# Patient Record
Sex: FEMALE | Race: WHITE
[De-identification: names, ages, dates, MRNs, and addresses within clinical notes are randomized per-mention and may not be internally consistent; named-entity substitution may affect disease eponyms.]

---

## 2022-12-01 ENCOUNTER — HOSPITAL ENCOUNTER (EMERGENCY)
Dept: HOSPITAL 97 - ER | Age: 32
Discharge: ANOTHER HOSPITAL | End: 2022-12-01
Payer: COMMERCIAL

## 2022-12-01 VITALS — TEMPERATURE: 97.9 F

## 2022-12-01 VITALS — DIASTOLIC BLOOD PRESSURE: 56 MMHG | SYSTOLIC BLOOD PRESSURE: 96 MMHG | OXYGEN SATURATION: 97 %

## 2022-12-01 DIAGNOSIS — K80.63: Primary | ICD-10-CM

## 2022-12-01 DIAGNOSIS — Z20.822: ICD-10-CM

## 2022-12-01 DIAGNOSIS — Z88.1: ICD-10-CM

## 2022-12-01 DIAGNOSIS — Z88.8: ICD-10-CM

## 2022-12-01 LAB
ALBUMIN SERPL BCP-MCNC: 3.6 G/DL (ref 3.4–5)
ALP SERPL-CCNC: 90 U/L (ref 45–117)
ALT SERPL W P-5'-P-CCNC: 654 U/L (ref 12–78)
AST SERPL W P-5'-P-CCNC: 862 U/L (ref 15–37)
BUN BLD-MCNC: 11 MG/DL (ref 7–18)
GLUCOSE SERPLBLD-MCNC: 106 MG/DL (ref 74–106)
HCT VFR BLD CALC: 39.4 % (ref 36–45)
LIPASE SERPL-CCNC: 163 U/L (ref 73–393)
LYMPHOCYTES # SPEC AUTO: 1.4 K/UL (ref 0.7–4.9)
MCV RBC: 94.1 FL (ref 80–100)
PMV BLD: 8.6 FL (ref 7.6–11.3)
POTASSIUM SERPL-SCNC: 4.1 MMOL/L (ref 3.5–5.1)
RBC # BLD: 4.18 M/UL (ref 3.86–4.86)

## 2022-12-01 PROCEDURE — 99285 EMERGENCY DEPT VISIT HI MDM: CPT

## 2022-12-01 PROCEDURE — 85025 COMPLETE CBC W/AUTO DIFF WBC: CPT

## 2022-12-01 PROCEDURE — 96374 THER/PROPH/DIAG INJ IV PUSH: CPT

## 2022-12-01 PROCEDURE — 80053 COMPREHEN METABOLIC PANEL: CPT

## 2022-12-01 PROCEDURE — 81003 URINALYSIS AUTO W/O SCOPE: CPT

## 2022-12-01 PROCEDURE — 76705 ECHO EXAM OF ABDOMEN: CPT

## 2022-12-01 PROCEDURE — 36415 COLL VENOUS BLD VENIPUNCTURE: CPT

## 2022-12-01 PROCEDURE — 87811 SARS-COV-2 COVID19 W/OPTIC: CPT

## 2022-12-01 PROCEDURE — 83690 ASSAY OF LIPASE: CPT

## 2022-12-01 NOTE — EDPHYS
Physician Documentation                                                                           

 [facility]                                                                 

Name: Adri Chaves                                                                               

Age: 32 yrs                                                                                       

Sex: Female                                                                                       

: 1990                                                                                   

MRN: [id number]                                                                                   

Arrival Date: 2022                                                                          

Time: 13:59                                                                                       

Account#: [id number]                                                                            

Bed 18                                                                                            

Private MD:                                                                                       

MASTER Physician Ryan Reeves                                                                      

HPI:                                                                                              

                                                                                             

16:35 This 32 yrs old Female presents to ER via Ambulatory with complaints of gallbladder.    cp  

16:35 The patient presents with abdominal pain in the epigastric area.                        cp  

16:35 Onset: The symptoms/episode began/occurred this morning. Associated signs and symptoms: cp  

      Pertinent positives: anorexia, nausea, mid back pain times 4 days, Pertinent negatives:     

      dysuria, fever, headache, vomiting. Severity of pain: in the emergency department the       

      pain has improved moderately. Patient reports she was seen earlier today at [facility]       

      [facility] and diagnosed with gallstones. Patient declined transfer for surgical         

      evaluation and was going to f/u with general surgery.                                       

                                                                                                  

Historical:                                                                                       

- Allergies:                                                                                      

14:22 azithromycin;                                                                           ld1 

17:41 OXYTOCIN;                                                                               iw  

- PMHx:                                                                                           

14:22 Gallstone;                                                                              ld1 

- PSHx:                                                                                           

14:22  section;                                                                       ld1 

                                                                                                  

- Immunization history:: Adult Immunizations up to date, Client reports receiving the             

  2nd dose of the Covid vaccine.                                                                  

- Social history:: Smoking status: Patient denies any tobacco usage or history of.                

  Patient/guardian denies using alcohol.                                                          

                                                                                                  

                                                                                                  

ROS:                                                                                              

16:40 Abdomen/GI: Positive for abdominal pain, nausea, anorexia, Negative for vomiting,       cp  

      diarrhea, constipation.                                                                     

16:40 Back: Positive for pain at rest, of the mid back area.                                  cp  

                                                                                                  

Exam:                                                                                             

16:45 Constitutional: The patient appears in no acute distress, alert, awake,                 cp  

      non-diaphoretic, non-toxic, well developed, well nourished.                                 

16:45 Head/Face:  Normocephalic, atraumatic.                                                  cp  

16:45 Eyes: Periorbital structures: appear normal, Conjunctiva: normal, no exudate, no            

      injection, Sclera: no appreciated abnormality, Lids and lashes: appear normal,              

      bilaterally.                                                                                

16:45 ENT: External ear(s): are unremarkable, Nose: is normal, Mouth: Lips: moist, Oral           

      mucosa: moist, Posterior pharynx: Airway: no evidence of obstruction, patent.               

16:45 Chest/axilla: Inspection: normal.                                                           

16:45 Cardiovascular: Rate: normal, Rhythm: regular.                                              

16:45 Respiratory: the patient does not display signs of respiratory distress,  Respirations:     

      normal, no use of accessory muscles, no retractions, labored breathing, is not present,     

      Breath sounds: are clear throughout, no decreased breath sounds, no stridor, no             

      wheezing.                                                                                   

16:45 Abdomen/GI: Inspection: abdomen appears normal, Bowel sounds: active, all quadrants,        

      Palpation: soft, in all quadrants, moderate abdominal tenderness, in the epigastric         

      area and right upper quadrant, rebound tenderness, is not appreciated, involuntary          

      guarding, is not appreciated.                                                               

16:45 Back: pain, that is moderate, of the  mid back area, ROM is normal.                         

16:45 Skin: no rash present.                                                                      

16:45 Neuro: Orientation: to person, place \T\ time. Mentation: is normal, Motor: moves all       

      fours, strength is normal, Sensation: is normal.                                            

                                                                                                  

Vital Signs:                                                                                      

14:20  / 83; Pulse 84; Resp 18; Temp 97.9(O); Pulse Ox 99% on R/A; Weight 81.19 kg;     ld1 

      Height 5 ft. 4 in. (162.56 cm); Pain 7/10;                                                  

19:25  / 67; Pulse 86; Resp 18; Pulse Ox 100% on R/A;                                   ld1 

20:30  / 63; Pulse 77; Resp 18; Pulse Ox 100% on R/A;                                   ld1 

21:33 BP 96 / 56; Pulse 79; Resp 18; Pulse Ox 97% on R/A;                                     ld1 

14:20 Body Mass Index 30.72 (81.19 kg, 162.56 cm)                                             ld1 

                                                                                                  

MDM:                                                                                              

14:24 Patient medically screened.                                                               

                                                                                                  

                                                                                             

16:27 Order name: CBC with Diff; Complete Time: 17:35                                         cp  

                                                                                             

17:35 Interpretation: Normal except: SAUNDRA% 76.0.                                                 

                                                                                             

16:27 Order name: CMP; Complete Time: 18:22                                                   cp  

                                                                                             

18:22 Interpretation: Reviewed.                                                                 

                                                                                             

16:27 Order name: Lipase; Complete Time: 18:22                                                cp  

                                                                                             

16:28 Order name: SARS RAPID; Complete Time: 18:22                                            cp  

                                                                                             

16:27 Order name: US Abdomen Limited: gallbladder; Complete Time: 19:03                       cp  

                                                                                             

19:06 Interpretation: Report reviewed.                                                          

                                                                                             

19:09 Order name: Urine Dipstick-Ancillary; Complete Time: 19:16                              EDMS

                                                                                             

16:27 Order name: IV Saline Lock; Complete Time: 17:17                                          

                                                                                             

16:27 Order name: Labs collected and sent; Complete Time: 17:17                                 

                                                                                             

16:27 Order name: Urine Dipstick-Ancillary (obtain specimen); Complete Time: 19:07              

                                                                                             

16:27 Order name: Urine Pregnancy Test (obtain specimen); Complete Time: 19:07                  

                                                                                             

17:38 Order name: NPO; Complete Time: 19:00                                                   cp  

                                                                                                  

Administered Medications:                                                                         

17:29 Drug: NS 0.9% 1000 ml Route: IV; Rate: 1 bolus; Site: left forearm;                     iw  

17:29 Not Given (Patient Refused): Pepcid (famotidine) 20 mg IVP once; dilute with 10 mL 0.9% iw  

      NaCl; give over 2 minutes                                                                   

17:29 Not Given (Patient Refused): Zofran (Ondansetron) 4 mg IVP once; over 2 minutes         iw  

19:25 Drug: Zosyn (piperacillin-tazobactam) 3.375 grams Route: IVPB; Infused Over: 60 mins;   ld1 

      Site: left antecubital;                                                                     

19:25 Not Given (Patient Refused): morphine 2 mg IVP once over 4 mins                         ld1 

19:25 Not Given (Patient Refused): morphine 2 mg IVP once over 4 mins                         ld1 

                                                                                                  

                                                                                                  

Disposition Summary:                                                                              

22 18:25                                                                                    

Transfer Ordered                                                                                  

      Transfer Location: [facility]                                cp  

      Reason: Higher level of care                                                            cp  

      Condition: Stable                                                                       cp  

      Problem: new                                                                            cp  

      Symptoms: have improved                                                                 cp  

      Accepting Physician: DR VANIA Sandoval(22 21:34)                                    ld1 

      Diagnosis                                                                                   

        - Calculus of gallbladder and bile duct with acute cholecystitis with obstruction     cp  

      Forms:                                                                                      

        - Medication Reconciliation Form                                                      cp  

        - SBAR form                                                                           cp  

Addendum:                                                                                         

2022                                                                                        

     08:02 Co-signature as Attending Physician, Ryan Reeves MD I agree with the assessment and  c
ha

           plan of care.                                                                          

                                                                                                  

Signatures:                                                                                       

Dispatcher [employer]                           EDMS                                                 

Ryan Reeves MD MD cha Williams, Irene RN                     RN   iw                                                   

Ryan Turner PA PA   cp                                                   

Dibbern, Jessy, RN                     RN   ld1                                                  

                                                                                                  

Corrections: (The following items were deleted from the chart)                                    

                                                                                             

14:22 14:22 Allergies: No Known Allergies; ld1                                                ld1 

17:35 17:35 Normal except. cp                                                                 cp  

19:19 18:25 doctor cp                                                                         cp  

21:34 19:19 DR VANIA Sandoval cp                                                                ld1 

                                                                                                  

**************************************************************************************************

## 2022-12-01 NOTE — RAD REPORT
EXAM DESCRIPTION:  US - Abdomen Exam Limited - 12/1/2022 6:16 pm

 

CLINICAL HISTORY:  ABD PAIN

 

COMPARISON:  No comparisons

 

FINDINGS:  The gallbladder demonstrates extensive cholelithiasis. No pericholecystic fluid or gallbla
dder wall thickening. The common bile duct is normal measuring 5-6 mm..

 

The liver demonstrates no findings of intrahepatic biliary dilatation.

 

IMPRESSION:  Extensive cholelithiasis.

## 2022-12-01 NOTE — ER
Nurse's Notes                                                                                     

 [facility]                                                                 

Name: Adri Chaves                                                                               

Age: 32 yrs                                                                                       

Sex: Female                                                                                       

: 1990                                                                                   

MRN: [id number]                                                                                   

Arrival Date: 2022                                                                          

Time: 13:59                                                                                       

Account#: [id number]                                                                            

Bed 18                                                                                            

Private MD:                                                                                       

Diagnosis: Calculus of gallbladder and bile duct with acute cholecystitis with obstruction        

                                                                                                  

Presentation:                                                                                     

                                                                                             

14:20 Chief complaint: Patient states: Went to [facility] today - gallstones. Pt reports     ld1 

      going home with prescriptions but "wants to be admitted.". Coronavirus screen: At this      

      time, the client does not indicate any symptoms associated with coronavirus-19. Ebola       

      Screen: No symptoms or risks identified at this time. Initial Sepsis Screen: Does the       

      patient meet any 2 criteria? No. Patient's initial sepsis screen is negative. Does the      

      patient have a suspected source of infection? No. Patient's initial sepsis screen is        

      negative. Risk Assessment: Do you want to hurt yourself or someone else? Patient            

      reports no desire to harm self or others. Onset of symptoms was 2022.          

14:20 Method Of Arrival: Ambulatory                                                           ld1 

14:20 Acuity: ELLEN 3                                                                           ld1 

                                                                                                  

Triage Assessment:                                                                                

14:22 General: Appears in no apparent distress. uncomfortable, Behavior is calm, cooperative, ld1 

      appropriate for age. Pain: Complains of pain in abdomen Pain does not radiate. Pain         

      currently is 7 out of 10 on a pain scale. Quality of pain is described as sharp,            

      shooting, throbbing. EENT: No signs and/or symptoms were reported regarding the EENT        

      system. Neuro: Level of Consciousness is awake, alert, obeys commands, Oriented to          

      person, place, time, situation, Appropriate for age. Cardiovascular: Capillary refill <     

      3 seconds Patient's skin is warm and dry. Respiratory: Airway is patent Respiratory         

      effort is even, unlabored. GI: Abdomen is round non-distended. : No signs and/or          

      symptoms were reported regarding the genitourinary system. Derm: No signs and/or            

      symptoms reported regarding the dermatologic system. Musculoskeletal: No signs and/or       

      symptoms reported regarding the musculoskeletal system.                                     

                                                                                                  

Historical:                                                                                       

- Allergies:                                                                                      

14:22 azithromycin;                                                                           ld1 

17:41 OXYTOCIN;                                                                               iw  

- PMHx:                                                                                           

14:22 Gallstone;                                                                              ld1 

- PSHx:                                                                                           

14:22  section;                                                                       ld1 

                                                                                                  

- Immunization history:: Adult Immunizations up to date, Client reports receiving the             

  2nd dose of the Covid vaccine.                                                                  

- Social history:: Smoking status: Patient denies any tobacco usage or history of.                

  Patient/guardian denies using alcohol.                                                          

                                                                                                  

                                                                                                  

Screenin:26 Abuse screen: Denies threats or abuse. Denies injuries from another. Nutritional        ld1 

      screening: No deficits noted. Tuberculosis screening: No symptoms or risk factors           

      identified. Fall Risk None identified.                                                      

                                                                                                  

Assessment:                                                                                       

19:26 General: Appears in no apparent distress. comfortable, Behavior is calm, cooperative,   ld1 

      appropriate for age. Pain: Complains of pain in abdomen Pain does not radiate. Pain         

      currently is 6 out of 10 on a pain scale. Quality of pain is described as throbbing.        

      Neuro: Level of Consciousness is awake, alert, obeys commands, Oriented to person,          

      place, time, situation. Cardiovascular: Capillary refill < 3 seconds Patient's skin is      

      warm and dry. Respiratory: Airway is patent Respiratory effort is even, unlabored. GI:      

      Abdomen is round non-distended, Reports lower abdominal pain, upper abdominal pain. :     

      No signs and/or symptoms were reported regarding the genitourinary system. EENT: No         

      signs and/or symptoms were reported regarding the EENT system. Derm: No signs and/or        

      symptoms reported regarding the dermatologic system. Musculoskeletal: No signs and/or       

      symptoms reported regarding the musculoskeletal system.                                     

21:33 Reassessment: Patient appears in no apparent distress at this time. Patient and/or      ld1 

      family updated on plan of care and expected duration. Pain level reassessed. Patient is     

      alert, oriented x 3, equal unlabored respirations, skin warm/dry/pink.                      

                                                                                                  

Vital Signs:                                                                                      

14:20  / 83; Pulse 84; Resp 18; Temp 97.9(O); Pulse Ox 99% on R/A; Weight 81.19 kg;     ld1 

      Height 5 ft. 4 in. (162.56 cm); Pain 7/10;                                                  

19:25  / 67; Pulse 86; Resp 18; Pulse Ox 100% on R/A;                                   ld1 

20:30  / 63; Pulse 77; Resp 18; Pulse Ox 100% on R/A;                                   ld1 

21:33 BP 96 / 56; Pulse 79; Resp 18; Pulse Ox 97% on R/A;                                     ld1 

14:20 Body Mass Index 30.72 (81.19 kg, 162.56 cm)                                             ld1 

                                                                                                  

ED Course:                                                                                        

13:59 Patient arrived in ED.                                                                  as  

14:02 Ryan Turner PA is PHCP.                                                                cp  

14:02 Ryan Reeves MD is Attending Physician.                                             cp  

14:22 Triage completed.                                                                       ld1 

14:22 Arm band placed on right wrist.                                                         ld1 

17:14 Emma Ceja RN is Primary Nurse.                                                   iw  

17:14 Inserted saline lock: 22 gauge in left forearm, using aseptic technique.                iw  

18:18 US Abdomen Limited: gallbladder In Process Unspecified.                                 EDMS

19:26 Patient has correct armband on for positive identification. Placed in gown. Bed in low  ld1 

      position. Call light in reach. Side rails up X2. Cardiac monitor on. Pulse ox on. NIBP      

      on. Door closed. Noise minimized. Warm blanket given.                                       

19:26 No provider procedures requiring assistance completed.                                  ld1 

21:33 Patient transferred, IV remains in place.                                               ld1 

                                                                                                  

Administered Medications:                                                                         

17:29 Drug: NS 0.9% 1000 ml Route: IV; Rate: 1 bolus; Site: left forearm;                     iw  

17:29 Not Given (Patient Refused): Pepcid (famotidine) 20 mg IVP once; dilute with 10 mL 0.9% iw  

      NaCl; give over 2 minutes                                                                   

17:29 Not Given (Patient Refused): Zofran (Ondansetron) 4 mg IVP once; over 2 minutes         iw  

19:25 Drug: Zosyn (piperacillin-tazobactam) 3.375 grams Route: IVPB; Infused Over: 60 mins;   ld1 

      Site: left antecubital;                                                                     

19:25 Not Given (Patient Refused): morphine 2 mg IVP once over 4 mins                         ld1 

19:25 Not Given (Patient Refused): morphine 2 mg IVP once over 4 mins                         ld1 

                                                                                                  

                                                                                                  

Medication:                                                                                       

19:26 VIS not applicable for this client.                                                     ld1 

                                                                                                  

Outcome:                                                                                          

18:25 ER care complete, transfer ordered by MD.                                               cp  

21:33 Transferred by ground EMS to [facility].                             ld1 

21:33 Condition: stable                                                                           

21:33 Instructed on the need for transfer.                                                        

21:34 Patient left the ED.                                                                    ld1 

                                                                                                  

Signatures:                                                                                       

Dispatcher [employer]                           EDMS                                                 

Consuelo Le                             as                                                   

Emma Ceja RN                     RN   iw                                                   

Page, ELÍAS Davis cp, Lauren, RN                     RN   ld1                                                  

                                                                                                  

Corrections: (The following items were deleted from the chart)                                    

14: 14:22 Allergies: No Known Allergies; ld1                                                ld1 

17:29 17:29 Pepcid (famotidine) 20 mg IVP in left forearm iw                                  iw  

                                                                                                  

**************************************************************************************************

## 2022-12-21 ENCOUNTER — HOSPITAL ENCOUNTER (EMERGENCY)
Dept: HOSPITAL 97 - ER | Age: 32
Discharge: HOME | End: 2022-12-21
Payer: COMMERCIAL

## 2022-12-21 VITALS — OXYGEN SATURATION: 99 % | SYSTOLIC BLOOD PRESSURE: 101 MMHG | DIASTOLIC BLOOD PRESSURE: 65 MMHG

## 2022-12-21 VITALS — TEMPERATURE: 98 F

## 2022-12-21 DIAGNOSIS — Z90.49: ICD-10-CM

## 2022-12-21 DIAGNOSIS — Z88.5: ICD-10-CM

## 2022-12-21 DIAGNOSIS — Z88.1: ICD-10-CM

## 2022-12-21 DIAGNOSIS — R11.2: ICD-10-CM

## 2022-12-21 DIAGNOSIS — R51.9: Primary | ICD-10-CM

## 2022-12-21 DIAGNOSIS — Z20.822: ICD-10-CM

## 2022-12-21 LAB
ALBUMIN SERPL BCP-MCNC: 3.8 G/DL (ref 3.4–5)
ALP SERPL-CCNC: 88 U/L (ref 45–117)
ALT SERPL W P-5'-P-CCNC: 25 U/L (ref 13–56)
AST SERPL W P-5'-P-CCNC: 12 U/L (ref 15–37)
BUN BLD-MCNC: 14 MG/DL (ref 7–18)
GLUCOSE SERPLBLD-MCNC: 106 MG/DL (ref 74–106)
HCT VFR BLD CALC: 40.1 % (ref 36–45)
LIPASE SERPL-CCNC: 141 U/L (ref 73–393)
LYMPHOCYTES # SPEC AUTO: 1.3 K/UL (ref 0.7–4.9)
MCV RBC: 94.7 FL (ref 80–100)
PMV BLD: 7.7 FL (ref 7.6–11.3)
POTASSIUM SERPL-SCNC: 4.3 MMOL/L (ref 3.5–5.1)
RBC # BLD: 4.24 M/UL (ref 3.86–4.86)
SARS-COV-2 RNA RESP QL NAA+PROBE: NEGATIVE
SP GR UR: 1.02 (ref 1–1.03)

## 2022-12-21 PROCEDURE — 81003 URINALYSIS AUTO W/O SCOPE: CPT

## 2022-12-21 PROCEDURE — 80053 COMPREHEN METABOLIC PANEL: CPT

## 2022-12-21 PROCEDURE — 81025 URINE PREGNANCY TEST: CPT

## 2022-12-21 PROCEDURE — 85025 COMPLETE CBC W/AUTO DIFF WBC: CPT

## 2022-12-21 PROCEDURE — 36415 COLL VENOUS BLD VENIPUNCTURE: CPT

## 2022-12-21 PROCEDURE — 0240U: CPT

## 2022-12-21 PROCEDURE — 83690 ASSAY OF LIPASE: CPT

## 2022-12-21 PROCEDURE — 70450 CT HEAD/BRAIN W/O DYE: CPT

## 2022-12-21 NOTE — ER
Nurse's Notes                                                                                     

 [facility]                                                                 

Name: Adri Chaves                                                                               

Age: 32 yrs                                                                                       

Sex: Female                                                                                       

: 1990                                                                                   

MRN: [id number]                                                                                   

Arrival Date: 2022                                                                          

Time: 09:11                                                                                       

Account#: [id number]                                                                            

Bed 15                                                                                            

Private MD:                                                                                       

Diagnosis: Nausea with vomiting, unspecified;Headache                                             

                                                                                                  

Presentation:                                                                                     

                                                                                             

09:22 Chief complaint: Patient states: vomited once yesterday, had gallbladder removed dec 6, iw  

      also has had right sided headache. Coronavirus screen: Client presents with at least        

      one sign or symptom that may indicate coronavirus-19. Ebola Screen: Patient negative        

      for fever greater than or equal to 101.5 degrees Fahrenheit, and additional compatible      

      Ebola Virus Disease symptoms Patient denies exposure to infectious person. Patient          

      denies travel to an Ebola-affected area in the 21 days before illness onset. No             

      symptoms or risks identified at this time. Initial Sepsis Screen: Does the patient meet     

      any 2 criteria? No. Patient's initial sepsis screen is negative. Does the patient have      

      a suspected source of infection? No. Patient's initial sepsis screen is negative. Risk      

      Assessment: Do you want to hurt yourself or someone else? Patient reports no desire to      

      harm self or others.                                                                        

09:22 Method Of Arrival: Ambulatory                                                           iw  

09:22 Acuity: ELLEN 3                                                                           iw  

09:28 Onset of symptoms is unknown.                                                           bp  

                                                                                                  

Triage Assessment:                                                                                

09:25 Headache History: The patient has had previous headaches and this one is different than bp  

      previous episodes. General: Appears in no apparent distress. comfortable, Behavior is       

      cooperative, appropriate for age, anxious. Pain: Complains of pain in head Pain             

      currently is 6 out of 10 on a pain scale. Pain began 2-3 days ago. Also complains of        

      nausea. EENT: No deficits noted. Neuro: Level of Consciousness is awake, alert, obeys       

      commands, Oriented to Appropriate for age Reports headache. Cardiovascular: No deficits     

      noted. Respiratory: No deficits noted. GI: Reports nausea, vomiting. : No signs           

      and/or symptoms were reported regarding the genitourinary system. Derm: No deficits         

      noted. Musculoskeletal: No deficits noted.                                                  

                                                                                                  

Historical:                                                                                       

- Allergies:                                                                                      

09:23 Azithromycin;                                                                           iw  

09: OXYTOCIN;                                                                               iw  

- PMHx:                                                                                           

09: gallstone;                                                                              iw  

- PSHx:                                                                                           

09:23  section;                                                                       iw  

: Cholecystectomy;                                                                        iw  

                                                                                                  

- Immunization history:: Adult Immunizations up to date.                                          

- Social history:: Smoking status: Patient denies any tobacco usage or history of.                

                                                                                                  

                                                                                                  

Screenin:26 [facility] ED Fall Risk Assessment (Adult) History of falling in the last 3 months,       bp  

      including since admission No falls in past 3 months (0 pts). Humpty Dumpty Scale Fall       

      Assessment Tool (age< 18yrs) Age 13 years and above (1 pt). Abuse screen: Denies            

      threats or abuse. Denies injuries from another. Nutritional screening: No deficits          

      noted. Tuberculosis screening: No symptoms or risk factors identified. Fall Risk No         

      fall in past 12 months (0 pts).                                                             

                                                                                                  

Assessment:                                                                                       

: General: SEE TRIAGE NOTE.                                                               bp  

10:05 Reassessment: PT RETURNED FROM CT.                                                      bp  

11:25 Reassessment: PT DC HOME AMBULATORY.                                                    bp  

                                                                                                  

Vital Signs:                                                                                      

09:28  / 75; Pulse 92; Resp 16; Temp 98; Pulse Ox 100% ;                                bp  

10:05 BP 96 / 64; Pulse 80; Resp 16; Pulse Ox 100% ;                                          bp  

11:25  / 65; Pulse 85; Resp 16; Pulse Ox 99% ;                                          bp  

                                                                                                  

Leon Coma Score:                                                                               

10:30 Eye Response: spontaneous(4). Verbal Response: oriented(5). Motor Response: obeys       jh7 

      commands(6). Total: 15.                                                                     

                                                                                                  

ED Course:                                                                                        

09:11 Patient arrived in ED.                                                                  [id number] 

09:17 Gali Kent FNP is PHCP.                                                          jh7 

09:17 Willis Fernandez MD is Attending Physician.                                            jh7 

09:18 Vince Farias, GEOVANNA is Primary Nurse.                                                    bp  

09:23 Triage completed.                                                                       iw  

09:23 Arm band placed on.                                                                     iw  

09:26 Patient has correct armband on for positive identification. Bed in low position. Call   bp  

      light in reach. Side rails up X2.                                                           

09:36 CT Head Brain wo Cont In Process Unspecified.                                           EDMS

09:36 COVID-19/FLU A+B Sent.                                                                  rs5 

09:48 CBC with Diff Sent.                                                                     rs5 

09:48 CMP Sent.                                                                               rs5 

09:48 Lipase Sent.                                                                            rs5 

09:48 Urine collected: clean catch specimen, clear. Inserted saline lock: 20 gauge in left    rs5 

      forearm, using aseptic technique. Blood collected.                                          

11:25 No provider procedures requiring assistance completed. IV discontinued, intact,         bp  

      bleeding controlled, No redness/swelling at site. Pressure dressing applied.                

                                                                                                  

Administered Medications:                                                                         

09: Drug: NS 0.9% 1000 ml Route: IV; Rate: 1 bolus; Site: left antecubital;                 bp  

11:26 Follow up: IV Status: Completed infusion; IV Intake: 1000ml                             bp  

09:45 Drug: Pepcid (famotidine) 20 mg Route: IVP; Site: left antecubital;                     bp  

11:27 Follow up: Response: No adverse reaction                                                bp  

09:45 Drug: Zofran (Ondansetron) 4 mg Route: IVP; Site: left antecubital;                     bp  

11:27 Follow up: Response: No adverse reaction                                                bp  

09:45 Drug: Ketorolac 30 mg Route: IVP; Site: left antecubital;                               bp  

11:26 Follow up: Response: No adverse reaction; Pain is decreased                             bp  

                                                                                                  

                                                                                                  

Medication:                                                                                       

09: VIS not applicable for this client.                                                     bp  

                                                                                                  

Intake:                                                                                           

11:26 IV: 1000ml; Total: 1000ml.                                                              bp  

                                                                                                  

Outcome:                                                                                          

10:26 Discharge ordered by MD. la 

11:25 Discharged to home ambulatory.                                                          bp  

11:25 Condition: stable                                                                           

11:25 Discharge instructions given to patient, Instructed on discharge instructions, follow       

      up and referral plans. medication usage, Demonstrated understanding of instructions,        

      follow-up care, medications, Prescriptions given X 2.                                       

11:27 Patient left the ED.                                                                    bp  

                                                                                                  

Signatures:                                                                                       

Dispatcher [employer]                           Emma Arceo, Vince Hudson RN, RN RN bp Mitchell, Jazmin jm9                                                  

Gali Kent, FNP                   FNP  7                                                  

Umberto Calles                               rs5                                                  

                                                                                                  

**************************************************************************************************

## 2022-12-21 NOTE — EDPHYS
Physician Documentation                                                                           

 [facility]                                                                 

Name: Adri Chaves                                                                               

Age: 32 yrs                                                                                       

Sex: Female                                                                                       

: 1990                                                                                   

MRN: [id number]                                                                                   

Arrival Date: 2022                                                                          

Time: 09:11                                                                                       

Account#: [id number]                                                                            

Bed 15                                                                                            

Private MD:                                                                                       

ED Physician Willis Fernandez                                                                     

HPI:                                                                                              

                                                                                             

09:23 This 32 yrs old Female presents to ER via Ambulatory with complaints of Headache >      jh7 

      24hrs Old, Vomiting.                                                                        

09:23 The patient complains of pain to the forehead and right temple. The patient describes   jh7 

      the headache as aching. Onset: The symptoms/episode began/occurred 1 week(s) ago.           

      Associated signs and symptoms: Pertinent positives: nausea, vomiting, chills.               

      32-year-old female presents for right-sided headache that is intermittent for the past      

      week. Reports that she vomited once yesterday, had diarrhea, and chills. Denies any         

      visual changes, syncope, weakness on one side of the body, or speech difficulty.            

      Reports that she had her gallbladder removed on ..                                

                                                                                                  

Historical:                                                                                       

- Allergies:                                                                                      

09:23 Azithromycin;                                                                           iw  

09: OXYTOCIN;                                                                               iw  

- PMHx:                                                                                           

09: gallstone;                                                                              iw  

- PSHx:                                                                                           

09:  section;                                                                       iw  

09:26 Cholecystectomy;                                                                        iw  

                                                                                                  

- Immunization history:: Adult Immunizations up to date.                                          

- Social history:: Smoking status: Patient denies any tobacco usage or history of.                

                                                                                                  

                                                                                                  

ROS:                                                                                              

09:23 ENT: Negative for injury, pain, and discharge, Neck: Negative for injury, pain, and     jh7 

      swelling, Cardiovascular: Negative for chest pain, palpitations, and edema,                 

      Respiratory: Negative for shortness of breath, cough, wheezing, and pleuritic chest         

      pain, Back: Negative for injury and pain, MS/Extremity: Negative for injury and             

      deformity, Skin: Negative for injury, rash, and discoloration.                              

09:23 Constitutional: Positive for body aches, chills, Negative for fever.                        

09:23 Abdomen/GI: Positive for nausea, vomiting, and diarrhea, Negative for abdominal pain.       

09:23 Neuro: Positive for headache, Negative for dizziness, syncope, visual changes.              

09:23 All other systems are negative.                                                             

                                                                                                  

Exam:                                                                                             

09:23 Constitutional:  This is a well developed, well nourished patient who is awake, alert,  jh7 

      and in no acute distress. Head/Face:  Normocephalic, atraumatic. Eyes:  Pupils equal        

      round and reactive to light, extra-ocular motions intact.  Lids and lashes normal.          

      Conjunctiva and sclera are non-icteric and not injected.  Cornea within normal limits.      

      Periorbital areas with no swelling, redness, or edema. ENT:  Nares patent. No nasal         

      discharge, no septal abnormalities noted.  Tympanic membranes are normal and external       

      auditory canals are clear.  Oropharynx with no redness, swelling, or masses, exudates,      

      or evidence of obstruction, uvula midline.  Mucous membranes moist. Neck:  Trachea          

      midline, no thyromegaly or masses palpated, and no cervical lymphadenopathy.  Supple,       

      full range of motion without nuchal rigidity, or vertebral point tenderness.  No            

      Meningismus. Cardiovascular:  Regular rate and rhythm with a normal S1 and S2.  No          

      gallops, murmurs, or rubs.  Normal PMI, no JVD.  No pulse deficits. Respiratory:  Lungs     

      have equal breath sounds bilaterally, clear to auscultation and percussion.  No rales,      

      rhonchi or wheezes noted.  No increased work of breathing, no retractions or nasal          

      flaring. Abdomen/GI:  Soft, non-tender, with normal bowel sounds.  No distension or         

      tympany.  No guarding or rebound.  No evidence of tenderness throughout. Back:  No          

      spinal tenderness.  No costovertebral tenderness.  Full range of motion. Skin:  Warm,       

      dry with normal turgor.  Normal color with no rashes, no lesions, and no evidence of        

      cellulitis. MS/ Extremity:  Pulses equal, no cyanosis.  Neurovascular intact.  Full,        

      normal range of motion. Neuro:  Awake and alert, GCS 15, oriented to person, place,         

      time, and situation.  Cranial nerves II-XII grossly intact.  Motor strength 5/5 in all      

      extremities.  Sensory grossly intact.  Cerebellar exam normal.  Normal gait.                

                                                                                                  

Vital Signs:                                                                                      

09:28  / 75; Pulse 92; Resp 16; Temp 98; Pulse Ox 100% ;                                bp  

10:05 BP 96 / 64; Pulse 80; Resp 16; Pulse Ox 100% ;                                          bp  

11:25  / 65; Pulse 85; Resp 16; Pulse Ox 99% ;                                          bp  

                                                                                                  

Burt Coma Score:                                                                               

10:30 Eye Response: spontaneous(4). Verbal Response: oriented(5). Motor Response: obeys       jh7 

      commands(6). Total: 15.                                                                     

                                                                                                  

MDM:                                                                                              

09:17 Patient medically screened.                                                             [facility] 

10:30 Differential diagnosis: cerebral vascular accident, migraine, sinusitis, tension        7 

      headache, Viral infection, Gastroenteritis. Data reviewed: vital signs, nurses notes,       

      lab test result(s), radiologic studies, CT scan. Data interpreted: Pulse oximetry: is       

      100 %. Interpretation: normal. Counseling: I had a detailed discussion with the patient     

      and/or guardian regarding: the historical points, exam findings, and any diagnostic         

      results supporting the discharge/admit diagnosis, to return to the emergency department     

      if symptoms worsen or persist or if there are any questions or concerns that arise at       

      home. ED course: The patient is currently on her menstrual cycle. The patient stated        

      that over the past few days she has eaten pizza twice and fettuccine Marv. States        

      that she knows that she is not supposed to eat greasy fatty foods after her gallbladder     

      was removed. Suspect that that could be the issue. Advised her to avoid greasy, fatty       

      foods, increase p.o. fluid intake, and rest at home..                                       

                                                                                                  

                                                                                             

09:25 Order name: CBC with Diff; Complete Time: 09:57                                         [facility] 

                                                                                             

09:25 Order name: CMP; Complete Time: 10:16                                                   [facility] 

                                                                                             

09:25 Order name: Lipase; Complete Time: 10:16                                                [facility] 

                                                                                             

09:25 Order name: COVID-19/FLU A+B; Complete Time: 10:16                                      [facility] 

                                                                                             

09:36 Order name: Urine Dipstick-Ancillary; Complete Time: 09:46                              [facility]

                                                                                             

09:25 Order name: IV Saline Lock; Complete Time: 09:48                                        [facility] 

                                                                                             

09:25 Order name: Labs collected and sent; Complete Time: 09:48                               [facility] 

                                                                                             

09:25 Order name: CT Head Brain wo Cont; Complete Time: 09:46                                 [facility] 

                                                                                             

09:45 Order name: Urine Pregnancy--Ancillary (enter results); Complete Time: 09:57            bd  

                                                                                             

09:25 Order name: Urine Dipstick-Ancillary (obtain specimen); Complete Time: 09:36            [facility] 

                                                                                             

09:25 Order name: Urine Pregnancy Test (obtain specimen); Complete Time: 09:36                [facility] 

                                                                                                  

Administered Medications:                                                                         

09:45 Drug: NS 0.9% 1000 ml Route: IV; Rate: 1 bolus; Site: left antecubital;                 bp  

11:26 Follow up: IV Status: Completed infusion; IV Intake: 1000ml                             bp  

09:45 Drug: Pepcid (famotidine) 20 mg Route: IVP; Site: left antecubital;                     bp  

11:27 Follow up: Response: No adverse reaction                                                bp  

09:45 Drug: Zofran (Ondansetron) 4 mg Route: IVP; Site: left antecubital;                     bp  

11:27 Follow up: Response: No adverse reaction                                                bp  

09:45 Drug: Ketorolac 30 mg Route: IVP; Site: left antecubital;                               bp  

11:26 Follow up: Response: No adverse reaction; Pain is decreased                             bp  

                                                                                                  

                                                                                                  

Disposition:                                                                                      

10:38 Co-signature as Attending Physician, Willis Fernandez MD I agree with the assessment and rt  

      plan of care.                                                                               

                                                                                                  

Disposition Summary:                                                                              

22 10:26                                                                                    

Discharge Ordered                                                                                 

      Location: Home                                                                          [facility] 

      Problem: new                                                                            [facility] 

      Symptoms: have improved                                                                 [facility] 

      Condition: Stable                                                                       [facility] 

      Diagnosis                                                                                   

        - Nausea with vomiting, unspecified                                                   [facility] 

        - Headache                                                                            [facility] 

      Followup:                                                                               [facility] 

        - With: Private Physician                                                                  

        - When: 2 - 3 days                                                                         

        - Reason: Recheck today's complaints                                                       

      Discharge Instructions:                                                                     

        - Discharge Summary Sheet                                                             [facility] 

        - General Headache Without Cause                                                      [facility] 

        - Nausea and Vomiting, Adult                                                          [facility] 

      Forms:                                                                                      

        - Medication Reconciliation Form                                                      [facility] 

        - Thank You Letter                                                                    [facility] 

      Prescriptions:                                                                              

        - ondansetron 4 mg Oral tablet,disintegrating                                              

            - place 1 tablet by TRANSLINGUAL route 4 times per day As needed; 20 tablet;      jh7 

      Refills: 0, Product Selection Permitted                                                     

        - Levsin 0.125 mg Oral Tablet                                                              

            - take 1 tablet by ORAL route every 8 hours; 30 tablet; Refills: 0, Product       jh7 

      Selection Permitted                                                                         

Signatures:                                                                                       

Dispatcher [employer]                           EDMS                                                 

Emma Ceja RN                     GEOVANNA   iw                                                   

Vince Farias RN                      RN   bp                                                   

Gali Kent, FNP                   FNP  [facility]                                                  

Willis Fernandez MD MD   rt                                                   

                                                                                                  

Corrections: (The following items were deleted from the chart)                                    

11:13 10:30 ED course: The patient stated that over the past few days she has eaten pizza     jh7 

      twice and fettuccine Marv. States that she knows that she is not supposed to eat         

      greasy fatty foods after her gallbladder was removed. Suspect that that could be the        

      issue. Advised her to avoid greasy, fatty foods, increase p.o. fluid intake, and rest       

      at home.. jh7                                                                               

                                                                                                  

**************************************************************************************************

## 2023-12-06 NOTE — RAD REPORT
EXAM DESCRIPTION:  CT - Head Brain Wo Cont - 12/21/2022 9:34 am

 

CLINICAL HISTORY:  R sided HA x 1 week

 

COMPARISON:  No comparisons

 

TECHNIQUE:  All CT scans are performed using dose optimization technique as appropriate and may inclu
de automated exposure control or mA/KV adjustment according to patient size.

 

FINDINGS:  No intracranial hemorrhage, hydrocephalus or extra-axial fluid collection.No areas of brai
n edema or evidence of midline shift.

 

Mild mucosal thickening left maxillary antrum. The paranasal sinuses and mastoids are otherwise clear
. The calvarium is intact.

 

IMPRESSION:  No acute intracranial abnormality.
Withheld/Decline to Answer